# Patient Record
Sex: MALE | Race: AMERICAN INDIAN OR ALASKA NATIVE | URBAN - METROPOLITAN AREA
[De-identification: names, ages, dates, MRNs, and addresses within clinical notes are randomized per-mention and may not be internally consistent; named-entity substitution may affect disease eponyms.]

---

## 2021-11-29 ENCOUNTER — OFFICE VISIT (OUTPATIENT)
Dept: ORTHOPEDIC SURGERY | Age: 36
End: 2021-11-29
Payer: COMMERCIAL

## 2021-11-29 VITALS — BODY MASS INDEX: 27.78 KG/M2 | HEIGHT: 67 IN | WEIGHT: 177 LBS | RESPIRATION RATE: 18 BRPM

## 2021-11-29 DIAGNOSIS — M54.16 LUMBAR RADICULAR PAIN: Primary | ICD-10-CM

## 2021-11-29 PROBLEM — M54.42 LEFT-SIDED LOW BACK PAIN WITH LEFT-SIDED SCIATICA: Status: ACTIVE | Noted: 2021-11-29

## 2021-11-29 PROCEDURE — 1036F TOBACCO NON-USER: CPT | Performed by: PHYSICIAN ASSISTANT

## 2021-11-29 PROCEDURE — 99204 OFFICE O/P NEW MOD 45 MIN: CPT | Performed by: PHYSICIAN ASSISTANT

## 2021-11-29 PROCEDURE — G8427 DOCREV CUR MEDS BY ELIG CLIN: HCPCS | Performed by: PHYSICIAN ASSISTANT

## 2021-11-29 PROCEDURE — G8484 FLU IMMUNIZE NO ADMIN: HCPCS | Performed by: PHYSICIAN ASSISTANT

## 2021-11-29 PROCEDURE — G8419 CALC BMI OUT NRM PARAM NOF/U: HCPCS | Performed by: PHYSICIAN ASSISTANT

## 2021-11-29 RX ORDER — METHYLPREDNISOLONE 4 MG/1
TABLET ORAL
Qty: 1 KIT | Refills: 0 | Status: SHIPPED | OUTPATIENT
Start: 2021-11-29

## 2021-11-30 NOTE — PROGRESS NOTES
History of present illness:   Mr. Braxton Dawson is a pleasant 39 y.o. male presents to the office for initial evaluation of low back pain and left anterior thigh pain with associated numbness left anterior thigh. He believes he may have been lifting heavy boxes of tile when symptoms began. He states his pain began 3 to 5 months ago. [facility] His pain has steadily worsened since onset. He rates his back pain 6/10 and leg pain anterior thigh 4/10. He describes the pain as throbbing. He denies weakness of his left or right leg. He denies bowel or bladder dysfunction and saddle anesthesia. He states he can sit for a maximum of 60 minutes and stand for a maximum 30 minutes. The pain does not disrupt his sleep. He has tried chiropractic manipulations and therapy x3 visits with mild temporary relief. Past medical history:  His past medical history has been reviewed. History reviewed. No pertinent past medical history. His past surgical history has been reviewed. Past Surgical History:   Procedure Laterality Date    EAR SURGERY Left 2000    KNEE ARTHROSCOPY Left 2008    NOSE SURGERY  2008         His medications and allergies were reviewed. Current Outpatient Medications   Medication Sig Dispense Refill    methylPREDNISolone (MEDROL, KANG,) 4 MG tablet Take by mouth. 6 po day one 5 po day 2 4 po day 3 3 po day 4 2 po day 5 1 po day 6. 1 kit 0     No current facility-administered medications for this visit. His social history has been reviewed. Social History     Occupational History    Not on file   Tobacco Use    Smoking status: Never Smoker    Smokeless tobacco: Never Used   Substance and Sexual Activity    Alcohol use: Never    Drug use: Never    Sexual activity: Not on file         His family history has been reviewed.    Family History   Problem Relation Age of Onset    Lung Cancer Father          Review of symptoms:  Review of Systems:  I have reviewed the clinically relevant past medical history, medications, allergies, family history, social history, and 13 point Review of Systems from the patient's recent history form & documented any details relevant to today's presenting complaints in the history above. The patient's self-reported past medical history, medications, allergies, family history, social history, and Review of Systems form from today's date have been scanned into the chart under the \"Media\" tab. Patient's review of symptoms was reviewed and is significant for back pain and negative for recent weight loss, fatigue, chills, visual disturbances, blood in stool or urine, recent infection. Physical examination:  Mr. Tamica Taveras most recent vitals:  Vitals  Resp: 18  Height: 5' 7\" (170.2 cm)  Weight: 177 lb (80.3 kg)  Body mass index is 27.72 kg/m². General exam:  He is well-developed and well-nourished, is in obvious discomfort and alert and oriented to person, place, and time. He demonstrates appropriate mood and affect. His skin is warm and dry. His gait is normal and he walks heel to toe without significant limp or instability. Back:  He stands with slight lumbar flexion. His lumbar flexion, extension and lateral bending are moderately reduced with pain. He has mild tenderness over his lumbar spine without obvious muscle spasm. The skin over his lumbar spine is normal without a surgical scar. Lower extremities:  He has 5/5 motor strength of bilateral lower extremities. He has a negative straight leg raise, bilaterally. He has a negative femoral stretch test bilaterally. Deep tendon reflexes at knees and achilles are 2+. Sensation is intact to light touch L3 to S1 bilaterally. He has no clonus. Hip range of motion is normal and pain-free  Stinchfield test is negative. Abdomen:  Non-tender and non-distended. No rash. Imaging:  X rays was obtained in the office today.   AP and lateral lumbar spine shows minimal degenerative disc disease with minimal disc space narrowing L4-5 and L5-S1. Diagnosis:      ICD-10-CM    1. Lumbar radicular pain  M54.16 XR LUMBAR SPINE (2-3 VIEWS)     methylPREDNISolone (MEDROL, KANG,) 4 MG tablet     Ambulatory referral to Physical Therapy          Assessment/ Plan:  Low back pain with left anterior thigh pain, numbness for 3 to 5 months. Symptoms started after lifting a heavy box of tile. He may have a lumbar disc herniation. He remains neurologically intact in both lower extremities. I had an extensive discussion with Mr. Coni Vaz and/or family regarding the natural history, etiology, and long term consequences of his condition. I have presented reasonable alternatives to the patient's proposed care, treatment, and services. Risks and benefits of the treatment options also reviewed in detail. I have outlined a treatment plan with them. He has had full opportunity to ask his questions. I have answered them all to his satisfaction. I feel that Mr. Coni Vaz understands our discussion today     New Medications prescribed today-Medrol Dosepak. PT-referred to physical therapy 2-3 times a week. He was advised to avoid lifting greater than 10 pounds. Further Imaging-discussed possible MRI lumbar spine if symptoms fail to resolve with medications and therapy or if symptoms worsen. Follow up- 2 weeks. He was instructed to call us emergently if he begins to experience bowel or bladder dysfunction, saddle anesthesia, increasing muscle weakness, or onset/ worsening leg symptoms. Chanel Handler MCKINLEY   Senior Physician Assistant   [facility]                                         Disclaimer: This note was generated with use of a verbal recognition program (DRAGON) and an attempt was made to check for errors.   It is possible that there are still dictated errors within this office note. If so, please bring any significant errors to my attention for an addendum. All efforts were made to ensure that this office note is accurate.

## 2021-12-09 ENCOUNTER — HOSPITAL ENCOUNTER (OUTPATIENT)
Dept: PHYSICAL THERAPY | Age: 36
Setting detail: THERAPIES SERIES
Discharge: HOME | End: 2021-12-09
Payer: COMMERCIAL

## 2021-12-09 PROCEDURE — 97110 THERAPEUTIC EXERCISES: CPT

## 2021-12-09 PROCEDURE — 97161 PT EVAL LOW COMPLEX 20 MIN: CPT

## 2021-12-09 NOTE — PLAN OF CARE
[place], [facility], [address]  Phone: [phone]   Fax: [phone]     Physical Therapy Certification    Dear Referring Practitioner: TL Lopez,    We had the pleasure of evaluating the following patient for physical therapy services at [address] [place] [address]. A summary of our findings can be found in the initial assessment below. This includes our plan of care. If you have any questions or concerns regarding these findings, please do not hesitate to contact me at the office phone number checked above. Thank you for the referral.       Physician Signature:_______________________________Date:__________________  By signing above (or electronic signature), therapists plan is approved by physician      Patient: Yenny Khan   : 1985   MRN: [id number]  Referring Physician: Referring Practitioner: TL Lopez      Evaluation Date: 2021      Medical Diagnosis Information:  Diagnosis: M54.16 (ICD-10-CM) - Lumbar radicular pain   Treatment Diagnosis: LBP, impaired posture, decreased lumbar and hip ROM, decreased hip strength, lumbar jt hypomobility                                         Insurance information: PT Insurance Information: [employer]     Precautions/ Contra-indications: avoid lifting greater than 10 pounds. Latex Allergy:  [x]NO      []YES  Preferred Language for Healthcare:   [x]English       []Other:    C-SSRS Triggered by Intake questionnaire (Past 2 wk assessment ):   [x] No, Questionnaire did not trigger screening.   [] Yes, Patient intake triggered C-SSRS Screening     [] Completed, no further action required. [] Completed, PCP notified via Epic    SUBJECTIVE: Patient reports he started having pain in . He was lifting tile in early  and a couple of weeks later started having pain. He also had twins in .  He saw the chiropractor approximately 4 visit, which was helpful but did not provide long last relief. Since the onset of symptoms, things have improved to the point when he is not working he doesn't have pain, but if he works the pain returns. Prescribed mederol dose pack, but has not taken. Wants to try therapy first for now. Symptoms are located at the R and L sides of the low back and the anterior L thigh. 11/30/21:  Imaging:  X rays was obtained in the office today. AP and lateral lumbar spine shows minimal degenerative disc disease with minimal disc space narrowing L4-5 and L5-S1. Fear avoidance: I should not do physical activities that (might) make my pain worse   [] True   [] False     Relevant Medical History: PMHx: L knee arthroscopy 2008  Functional Outcome: Oswestry: raw score = ; dysfunction = %-pt to return npv    Pain Scale: 5/10  Easing factors: sit, relaxing, avoiding work   Provocative factors: standing, working    Type: [x]Constant   [x]Intermittent  []Radiating []Localized []other:     Numbness/Tingling: Numbness affecting L anterior thigh    Occupation/School:     Living Status/Prior Level of Function: Prior to this injury / incident, pt was independent with ADLs and IADLs,    OBJECTIVE:   Palpation: mild TTP B PSIS, B lumbar paraspinals    Functional Mobility/Transfers: WFL    Posture: Standing: L iliac crest elevation, increased lumbar lordosis,  Increased thoracic kyphosis;  Supine: R ant innominate    Inspection:     Gait: (include devices/WB status) no AD, decreased arm swing and trunk rotation    Bandages/Dressings/Incisions: NA    Dermatomes Normal Abnormal Comments   inguinal area (L1)       anterior mid-thigh (L2)      distal ant thigh/med knee (L3) x     medial lower leg and foot (L4) x     lateral lower leg and foot (L5) x     posterior calf (S1) x     medial calcaneus (S2) x         Reflexes Normal Abnormal Comments   S1-2 Seated achilles x  2+ Bilat   S1-2 Prone knee bend      L3-4 Patellar tendon x  2+ Bilat   Clonus x  Bilat   Babinski          ROM  Comments   Lumbar Flex WNL    Lumbar Ext WNL Reproduces symptoms on R     ROM LEFT RIGHT Comments   Lumbar Side Bend WNL WNL    Lumbar Rotation WNL WNL    Hip Flexion Mod restriction Mod restriction    Hip Abd      Hip ER Mod restriction Mod restriction    Hip IR      Hip Extension      Knee Ext      Knee Flex      Hamstring Flex      Piriformis                      Joint mobility:    []Normal    [x]Hypo-lumbar P-A L3-L5 central and R unilateral   []Hyper      Strength / Myotomes LEFT RIGHT Comments   Multifidus      Transverse Ab      Hip Flexors (L1-2) 5 5    Hip Abductors 4+ 4*    Hip Extensors 4 4    Hip Internal Rotators      Hip External Rotators      Quads (L2-4) 5 5    Hamstrings  5 5    Ankle Plantarflexion (S1-2)      Ankle Dorsiflexion (L4-5) 5 5    Ankle Inversion      Ankle Eversion (S1-2)      Great Toe Extension (L5)        TA Muscle Contraction Scale    Criteria                                               Score  Quality of Contraction   Not Present      [] 0   Rapid, Superificial     [] 1   Just Perceptible     [] 2     Gentle, Slow      [] 3    Substitution   Resting       [] 0   Moderate to Strong     [] 1    Subtle Perceptible     [] 2   None       [] 3    Symmetry of Contraction   Unilateral       [] 0   Bilateral/Asymmetrical     [] 1   Symmetrical       [] 2    Breathing     Inability/Difficulty Breathing during contraction [] 0   Able to hold contraction while Breathing  [] 1    Holding   Able to Hold Contraction <10 s   [] 0   Able to Hold Contraction >10 s   [] 1      __/10  Adapted from Juan A gallego, Copyright 2009        Neural dynamic tension testing Normal Abnormal Comments   Slump Test  - Degree of knee flexion:  x     SLR       0-30 x     30-70 x     Femoral nerve (L2-4) x         Orthopedic Special Tests:    Normal Abnormal N/A Comments   Toe walk         Heel Walk       Fwd Bend-aberrant or innominate mvmt)       Standing Flexion test  x     Trendelenburg       Kemps/Quadrant       Noemyk       DAVID/Lucio       Hip scour       SLR x      Crossed SLR x      Supine to sit  x     Hip thrust       SI distraction/compression x      PA/Spring       Prone Instability test       Prone knee bend  x  Gets longer on R   Sacral Spring/thrust x      Seated flexion  x         [x] Patient history, allergies, meds reviewed. Medical chart reviewed. See intake form. Review Of Systems (ROS):  [x]Performed Review of systems (Integumentary, CardioPulmonary, Neurological) by intake and observation. Intake form has been scanned into medical record. Patient has been instructed to contact their primary care physician regarding ROS issues if not already being addressed at this time.       Co-morbidities/Complexities (which will affect course of rehabilitation):   []None        []Hx of COVID   Arthritic conditions   []Rheumatoid arthritis (M05.9)  []Osteoarthritis (M19.91)  []Gout   Cardiovascular conditions   []Hypertension (I10)  []Hyperlipidemia (E78.5)  []Angina pectoris (I20)  []Atherosclerosis (I70)  []Pacemaker  []Hx of CABG/stent/  cardiac surgeries   Musculoskeletal conditions   []Disc pathology   []Congenital spine pathologies   []Osteoporosis (M81.8)  []Osteopenia (M85.8)  []Scoliosis       Endocrine conditions   []Hypothyroid (E03.9)  []Hyperthyroid Gastrointestinal conditions   []Constipation (N46.68)   Metabolic conditions   []Morbid obesity (E66.01)  []Diabetes type 1(E10.65) or 2 (E11.65)   []Neuropathy (G60.9)     Cardio/Pulmonary conditions   []Asthma (J45)  []Coughing   []COPD (J44.9)  []CHF  []A-fib   Psychological Disorders  []Anxiety (F41.9)  []Depression (F32.9)   []Other:   Developmental Disorders  []Autism (F84.0)  []CP (G80)  []Down Syndrome (Q90.9)  []Developmental delay     Neurological conditions  []Prior Stroke (I69.30)  []Parkinson's (G20)  []Encephalopathy (G93.40)  []MS (G35)  []Post-polio (G14)  []SCI  []TBI  []ALS Other conditions  []Fibromyalgia (M79.7)  []Vertigo  []Syncope  []Kidney Failure  []Cancer      []currently undergoing                treatment  []Pregnancy  []Incontinence   Prior surgeries  []involved limb  []previous spinal surgery  [] section birth  []hysterectomy  []bowel / bladder surgery  []other relevant surgeries   []Other:   L knee arthroscopy             Barriers to/and or personal factors that will affect rehab potential:              []Age  []Sex    []Smoker              []Motivation/Lack of Motivation                        [x]Co-Morbidities              []Cognitive Function, education/learning barriers              []Environmental, home barriers              []profession/work barriers  []past PT/medical experience  []other:  Justification: L knee pain    Falls Risk Assessment (30 days):  [x] Falls Risk assessed and no intervention required. [] Falls Risk assessed and Patient requires intervention due to being higher risk   TUG score (>12s at risk):     [] Falls education provided, including         ASSESSMENT: Pt is a 40 yo male referred to PT for lumbar radiculopathy. He presents with deficits in posture, lumbar and hip ROM, core and posterior lateral hip strength, lumbar jt mobility. These deficits contribute to pain and decreased functional status. He will benefit from skilled therapy to address these deficits, achieve goals, and progress to PLOF.     Functional Impairments:     [x]Noted lumbar/proximal hip hypomobility   []Noted lumbosacral and/or generalized hypermobility   [x]Decreased Lumbosacral/hip/LE functional ROM   []Decreased core/proximal hip strength and neuromuscular control    [x]Decreased LE functional strength    []Abnormal reflexes/sensation/myotomal/dermatomal deficits  []Reduced balance/proprioceptive control    []other:      Functional Activity Limitations (from functional questionnaire and intake)   [x]Reduced ability to tolerate prolonged functional positions   [x]Reduced ability or difficulty with changes of positions or transfers between positions   [x]Reduced ability to maintain good posture and demonstrate good body mechanics with sitting, bending, and lifting   []Reduced ability to sleep   [] Reduced ability or tolerance with driving and/or computer work   [x]Reduced ability to perform lifting, reaching, carrying tasks   [x]Reduced ability to squat   [x]Reduced ability to forward bend   []Reduced ability to ambulate prolonged functional periods/distances/surfaces   []Reduced ability to ascend/descend stairs   []other:       Participation Restrictions   [x]Reduced participation in self care activities   [x]Reduced participation in home management activities   [x]Reduced participation in work activities   []Reduced participation in social activities. []Reduced participation in sport/recreational activities. Classification:   []Signs/symptoms consistent with Lumbar instability/stabilization subgroup. [x]Signs/symptoms consistent with Lumbar mobilization/manipulation subgroup, myotomes and dermatomes intact. Meets manipulation criteria. []Signs/symptoms consistent with Lumbar direction specific/centralization subgroup   [x]Signs/symptoms consistent with Lumbar traction subgroup     []Signs/symptoms consistent with lumbar facet dysfunction   []Signs/symptoms consistent with lumbar stenosis type dysfunction   []Signs/symptoms consistent with nerve root involvement including myotome & dermatome dysfunction   []Signs/symptoms consistent with post-surgical status including: decreased ROM, strength and function.    []signs/symptoms consistent with pathology which may benefit from Dry needling     []other:      Prognosis/Rehab Potential:      []Excellent   [x]Good    []Fair   []Poor    Tolerance of evaluation/treatment:    []Excellent   [x]Good    []Fair   []Poor     Physical Therapy Evaluation Complexity Justification  [x] A history of present problem with:  [] no personal factors and/or comorbidities that impact the plan of care;  [x]1-2 personal factors and/or comorbidities that impact the plan of care  []3 personal factors and/or comorbidities that impact the plan of care  [x] An examination of body systems using standardized tests and measures addressing any of the following: body structures and functions (impairments), activity limitations, and/or participation restrictions;:  [] a total of 1-2 or more elements   [x] a total of 3 or more elements   [] a total of 4 or more elements   [x] A clinical presentation with:  [x] stable and/or uncomplicated characteristics   [] evolving clinical presentation with changing characteristics  [] unstable and unpredictable characteristics;   [x] Clinical decision making of [x] low, [] moderate, [] high complexity using standardized patient assessment instrument and/or measurable assessment of functional outcome. [x] EVAL (LOW) 21332 (typically 15 minutes face-to-face)  [] EVAL (MOD) 56525 (typically 30 minutes face-to-face)  [] EVAL (HIGH) 71180 (typically 45 minutes face-to-face)  [] RE-EVAL     PLAN: Begin PT focusing on: proximal hip mobilizations, LB mobs, LB core activation, proximal hip activation, and HEP    Frequency/Duration:  2 days per week for 6 Weeks:  Interventions:  [x]  Therapeutic exercise including: strength training, ROM, for LE, Glutes and core   [x]  NMR activation and proprioception for glutes , LE and Core   [x]  Manual therapy as indicated for Hip complex, LE and spine to include: Dry Needling/IASTM, STM, PROM, Gr I-IV mobilizations, manipulation. [x]  Modalities as needed that may include: thermal agents, E-stim, Biofeedback, US, iontophoresis as indicated  [x]  Patient education on joint protection, postural re-education, activity modification, progression of HEP. HEP instruction: Written HEP instructions provided and reviewed.      GOALS:  Patient stated goal: \"return to PLOF, decrease pain\"  [] Progressing: [] Met: [] Not Met: [] Adjusted    Therapist goals for Patient:   Short Term Goals: To be achieved in: 2 weeks  1. Independent in HEP and progression per patient tolerance, in order to prevent re-injury. [] Progressing: [] Met: [] Not Met: [] Adjusted  2. Patient will have a decrease in pain to facilitate improvement in movement, function, and ADLs as indicated by Functional Deficits. [] Progressing: [] Met: [] Not Met: [] Adjusted    Long Term Goals: To be achieved in: 6 weeks  1. Disability index score of 15% or less for the SARABJIT to assist with reaching prior level of function. [] Progressing: [] Met: [] Not Met: [] Adjusted  2. Patient will demonstrate increased AROM to WNL, good LS mobility, good hip ROM to allow for proper joint functioning as indicated by patients Functional Deficits. [] Progressing: [] Met: [] Not Met: [] Adjusted  3. Patient will demonstrate an increase in bilat gross hip 5/5 strength to good proximal hip and core activation to allow for proper functional mobility as indicated by patients Functional Deficits. [] Progressing: [] Met: [] Not Met: [] Adjusted  4. Patient will return to functional activities including standing x 45 min without increased symptoms or restriction. [] Progressing: [] Met: [] Not Met: [] Adjusted  5. Patient will return to functional activities including working as  without increased symptoms or restriction.   [] Progressing: [] Met: [] Not Met: [] Adjusted     Electronically signed by:  Ayush Resendiz, PT, DPT

## 2021-12-09 NOTE — FLOWSHEET NOTE
Esau Abernathy  Phone: [phone]   Fax: [phone]    Physical Therapy Treatment Note/ Progress Report:     Date:  2021    Patient Name:  Phil Rain    :  1985  MRN: [id number]  Restrictions/Precautions:    Medical/Treatment Diagnosis Information:  Diagnosis: M54.16 (ICD-10-CM) - Lumbar radicular pain  Treatment Diagnosis: LBP, impaired posture, decreased lumbar and hip ROM, decreased hip strength, lumbar jt hypomobility  Insurance/Certification information:  PT Insurance Information: [facility]  Physician Information:  Referring Practitioner: TL Curiel  Plan of care signed (Y/N): []  Yes [x]  No    Date of Patient follow up with Physician:      Progress Report: []  Yes  [x]  No     Date Range for reporting period:  Beginnin21  Ending:     Progress report due (10 Rx/or 30 days whichever is less): visit #10 or 04 (date)     Recertification due (POC duration/ or 90 days whichever is less): visit #12 or 3/9/22 (date)     Visit # Insurance Allowable Auth required? Date Range   1 TBD (30/yr) [x]  Yes  []  No TBD       Units approved Units used Date Range   TBD 2 TBD     Latex Allergy:  [x]NO      []YES  Preferred Language for Healthcare:   [x]English       []other:    Functional Scale:       Date assessed:  Oswestry: raw score = ; dysfunction = %   21-pt to return npv    Pain level:  10     SUBJECTIVE:  See eval    OBJECTIVE: See eval   Observation:    Test measurements:      RESTRICTIONS/PRECAUTIONS: avoid lifting greater than 10 pounds. , pmhx L knee arthroscopy 2008/on going L knee pain    Treatment based classification: traction and/or manip    Comparable sign: lumbar extension    Exercises/Interventions:     Therapeutic Exercise (29172) Resistance / level Sets / Seconds Reps Notes / Cues   Bike       Hamstring stretch  Hip flexor stretch       Posterior lateral hip strength       Hip mobility HEP:  SKTC  Piriformis stretch  Glute set    30\"  30\"  1/10\"   2 B  2 B  10     Pt advised to support L knee jt line  Pt requires VC to produce   Therapeutic Activities (68589)                                          Neuromuscular Re-ed (23028)                                          Manual Intervention ([id number])       Prone PA       GISTM/STM       Lumbar Manip ? SI Manip       Hip belt mobs       Hip LA distraction                              Modalities: consider lumbar traction    Pt. Education:  -pt educated on diagnosis, prognosis and expectations for rehab  -all pt questions were answered    Home Exercise Prorgam:  Access Code: [email or web address]  URL: [email or web address]  Date: 12/09/2021  Prepared by: Meridee Schirmer    Exercises  Hooklying Single Knee to Chest - 1 x daily - 7 x weekly - 1 sets - 4 reps - 30 hold  Supine Piriformis Stretch with Foot on Ground - 1 x daily - 7 x weekly - 1 sets - 4 reps - 30 hold  Hooklying Gluteal Sets - 1 x daily - 7 x weekly - 1 sets - 10 reps - 10 hold      Therapeutic Exercise and NMR EXR  [x] (79493) Provided verbal/tactile cueing for activities related to strengthening, flexibility, endurance, ROM  for improvements in proximal hip and core control with self care, mobility, lifting and ambulation.  [] (04246) Provided verbal/tactile cueing for activities related to improving balance, coordination, kinesthetic sense, posture, motor skill, proprioception  to assist with core control in self care, mobility, lifting, and ambulation.   [] (56720) Therapist is in constant attendance of 2 or more patients providing skilled therapy interventions, but not providing any significant amount of measurable one-on-one time to either patient, for improvements in LE, proximal hip, and core control in self care, mobility, lifting, ambulation and eccentric single leg control.      Therapeutic Activities:    [] (70162 or 35873) Provided verbal/tactile cueing for activities related to improving balance, coordination, kinesthetic sense, posture, motor skill, proprioception and motor activation to allow for proper function  with self care and ADLs  [] (44148) Provided training and instruction to the patient for proper core and proximal hip recruitment and positioning with ambulation re-education     Home Exercise Program:    [x] (98368) Reviewed/Progressed HEP activities related to strengthening, flexibility, endurance, ROM of core, proximal hip and LE for functional self-care, mobility, lifting and ambulation   [] (97277) Reviewed/Progressed HEP activities related to improving balance, coordination, kinesthetic sense, posture, motor skill, proprioception of core, proximal hip and LE for self care, mobility, lifting, and ambulation      Manual Treatments:  PROM / STM / Oscillations-Mobs:  G-I, II, III, IV (PA's, Inf., Post.)  [] (97286) Provided manual therapy to mobilize proximal hip and LS spine soft tissue/joints for the purpose of modulating pain, promoting relaxation,  increasing ROM, reducing/eliminating soft tissue swelling/inflammation/restriction, improving soft tissue extensibility and allowing for proper ROM for normal function with self care, mobility, lifting and ambulation. Charges:  Timed Code Treatment Minutes: 10   Total Treatment Minutes: 45       [x] EVAL - LOW (56441)   [] EVAL - MOD (10811)  [] EVAL - HIGH (55509)  [] RE-EVAL (45895)  [x] XG(31023) x   1    [] Ionto  [] NMR (74110) x       [] Vaso  [] Manual (47243) x       [] Ultrasound  [] TA x        [] Mech Traction (06438)  [] Aquatic Therapy x     [] ES (un) (86885):   [] Home Management Training x  [] ES(attended) (70814)   [] Dry Needling 1-2 muscles (56638):  [] Dry Needling 3+ muscles (291934  [] Group:      [] Other:     Goals:   Patient stated goal: \"return to PLOF, decrease pain\"  []? Progressing: []? Met: []? Not Met: []?  Adjusted     Therapist goals for Patient:   Short Term Goals: Limitations/Impairments:  []Sitting [x]Standing   [x]Walking []Squatting/bending    []Stairs [x]ADL's    []Transfers []Reaching  [x]Housework [x]Job related tasks  []Driving [x]Sports/Recreation   []Sleeping []Other:    ASSESSMENT:  See eval  Treatment/Activity Tolerance:  [x] Patient able to complete tx  [] Patient limited by fatique  [] Patient limited by pain  [] Patient limited by other medical complications  [] Other:     Prognosis: [x] Good [] Fair  [] Poor    Patient Requires Follow-up: [x] Yes  [] No    PLAN: See eval. PT 2x / week for 6 weeks. [] Continue per plan of care [] Alter current plan (see comments)  [x] Plan of care initiated [] Hold pending MD visit [] Discharge    Electronically signed by: Shelli Pinto, PT, DPT      Note: If patient does not return for scheduled/ recommended follow up visits, this note will serve as a discharge from care along with most recent update on progress.

## 2022-02-24 ENCOUNTER — HOSPITAL ENCOUNTER (OUTPATIENT)
Dept: PHYSICAL THERAPY | Age: 37
Setting detail: THERAPIES SERIES
Discharge: HOME | End: 2022-02-24
Payer: COMMERCIAL

## 2022-02-24 PROCEDURE — 97164 PT RE-EVAL EST PLAN CARE: CPT

## 2022-02-24 PROCEDURE — 97110 THERAPEUTIC EXERCISES: CPT

## 2022-02-24 PROCEDURE — 97140 MANUAL THERAPY 1/> REGIONS: CPT

## 2022-02-24 NOTE — PLAN OF CARE
Esau Abernathy  Phone: [phone]   Fax: [phone]  Physical Therapy Re-Certification Plan of Care    Dear TL Hoffmann,    We had the pleasure of treating the following patient for physical therapy services at [facility]. A summary of our findings can be found in the updated assessment below. This includes our plan of care. If you have any questions or concerns regarding these findings, please do not hesitate to contact me at the office phone number checked above. Thank you for the referral.     Physician Signature:________________________________Date:__________________  By signing above (or electronic signature), therapist's plan is approved by physician      Functional Outcome:   Owsestry Disability Total Scores: 16 pts, 32% disability 22           Overall Response to Treatment:   []Patient is responding well to treatment and improvement is noted with regards  to goals   []Patient should continue to improve in reasonable time if they continue HEP   []Patient has plateaued and is no longer responding to skilled PT intervention    []Patient is getting worse and would benefit from return to referring MD   []Patient unable to adhere to initial POC   [x]Other: See Assessment    Date range of Visits:  eval, 22 re-eval  Total Visits: 2    Recommendation:    []Continue PT 2x / wk for 6 weeks.                []Hold PT, pending MD visit      Physical Therapy Treatment Note/ Progress Report:     Date:  2022    Patient Name:  Soy Saavedra    :  1985  MRN: [id number]  Restrictions/Precautions:    Medical/Treatment Diagnosis Information:  Diagnosis: M54.16 (ICD-10-CM) - Lumbar radicular pain  Treatment Diagnosis: LBP, impaired posture, decreased lumbar and hip ROM, decreased hip strength, lumbar jt hypomobility  Insurance/Certification information:  PT Insurance Information: [facility]  Physician Information:  Referring Practitioner: TL Norris  Plan of care signed (Y/N): []  Yes [x]  No    Date of Patient follow up with Physician:      Progress Report: []  Yes  [x]  No     Date Range for reporting period:  Beginnin21  Ending:     Progress report due (10 Rx/or 30 days whichever is less): visit #10 or 13 (date)     Recertification due (POC duration/ or 90 days whichever is less): visit #12 or 3/9/22 (date)     Visit # Insurance Allowable Auth required? 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 They called him back and as soon as he returned to work the pain returned. Symptoms are located at the low back and anterior/lateral L thigh with prolonged standing. He also reports N/T in the area with occasional N/T in lateral 2 toes. He also has noticed pain in the upper back (points to central mid t spine). He continues to get exacerbation of symptoms with working and standing.       OBJECTIVE:    Observation:    Test measurements:      Posture: Standing: L illiac crest elevation, Seated: ilium level, Supine: R ant innominate, no apparent leg length discrepancy    ROM   Comments   Lumbar Flex WNL     Lumbar Ext WNL       ROM LEFT RIGHT Comments   Lumbar Side Bend WNL WNL     Lumbar Rotation WNL WNL     Hip Flexion Mod restriction Mod restriction     Hip Abd         Hip ER Mod restriction Mod restriction      Strength / Myotomes LEFT RIGHT Comments   Multifidus       Transverse Ab         Hip Flexors (L1-2) 5 5     Hip Abductors 4+ 4*     Hip Extensors 4+ 4+           Orthopedic Special Tests:     Normal Abnormal N/A Comments   Toe walk             Heel Walk           Fwd Bend-aberrant or innominate mvmt)           Standing Flexion test   x    R elevates before and higher than L   Trendelenburg           Kemps/Quadrant           Dominguez           DAVID/Lucio           Hip scour           SLR x         Crossed SLR x         Supine to sit  x        Hip thrust           SI distraction/compression x         PA/Spring           Prone Instability test           Prone knee bend   x   L gets longer   Sacral Spring/thrust x         Seated flexion  x              RESTRICTIONS/PRECAUTIONS: avoid lifting greater than 10 pounds. , pmhx L knee arthroscopy 2008/on going L knee pain    Treatment based classification: traction and/or manip    Comparable sign: lumbar extension    Exercises/Interventions:     Therapeutic Exercise (29726) Resistance / level Sets / Seconds Reps Notes / Cues   Bike       Hamstring stretch  Hip flexor stretch       Posterior lateral hip strength       Hip mobility       Cat cow       Hand heel rock                     HEP:  SKTC  Piriformis stretch  Glute set  Hip Flexor stretch    30\"  30\"  30\"   2 B  2 B  2 B     Pt advised to support L knee jt line  Pt requires VC to produce   Therapeutic Activities (62005)       Pt ed regarding posture correction, prognosis, POC  X 5 min                                 Neuromuscular Re-ed (62732)                                          Manual Intervention (53952)       Prone PA       GISTM/STM       Lumbar Manip ? SI Manip       Hip belt mobs       Hip LA distraction       L SI jt distraction mob  5 x 20\"     MET for R ant innominate   4 min              Modalities: consider lumbar traction    Pt.  Education:  -pt educated on diagnosis, prognosis and expectations for rehab  -all pt questions were answered    Home Exercise Hakeemgam:  Access Code: [email or web address]  URL: [email or web address]  Date: 12/09/2021  Prepared by: Syble [place]    Exercises  Hooklying Single Knee to Chest - 1 x daily - 7 x weekly - 1 sets - 4 reps - 30 hold  Supine Piriformis Stretch with Foot on Ground - 1 x daily - 7 x weekly - 1 sets - 4 reps - 30 hold  Hooklying Gluteal Sets - 1 x daily - 7 x weekly - 1 sets - 10 reps - 10 hold    Access Code: [email or web address]  URL: [email or web address]  Date: 02/24/2022  Prepared by: Syble [place]    Exercises  Hooklying Single Knee to Chest - 1 x daily - 7 x weekly - 1 sets - 4 reps - 30 hold  Supine Piriformis Stretch with Foot on Ground - 1 x daily - 7 x weekly - 1 sets - 4 reps - 30 hold  Hip Flexor Stretch with Chair - 1 x daily - 7 x weekly - 1 sets - 4 reps - 30 hold        Therapeutic Exercise and NMR EXR  [x] (48285) Provided verbal/tactile cueing for activities related to strengthening, flexibility, endurance, ROM  for improvements in proximal hip and core control with self care, mobility, lifting and ambulation.  [] (77836) Provided verbal/tactile cueing for activities related to improving balance, coordination, kinesthetic sense, posture, motor skill, proprioception  to assist with core control in self care, mobility, lifting, and ambulation.   [] (49397) Therapist is in constant attendance of 2 or more patients providing skilled therapy interventions, but not providing any significant amount of measurable one-on-one time to either patient, for improvements in LE, proximal hip, and core control in self care, mobility, lifting, ambulation and eccentric single leg control.      Therapeutic Activities:    [] (58114 or 15968) Provided verbal/tactile cueing for activities related to improving balance, coordination, kinesthetic sense, posture, motor skill, proprioception and motor activation to allow for proper function  with self care and ADLs  [] (87167) Provided training and instruction to the patient for proper core and proximal hip recruitment and positioning with ambulation re-education     Home Exercise Program:    [x] (51109) Reviewed/Progressed HEP activities related to strengthening, flexibility, endurance, ROM of core, proximal hip and LE for functional self-care, mobility, lifting and ambulation   [] (45456) Reviewed/Progressed HEP activities related to improving balance, coordination, kinesthetic sense, posture, motor skill, proprioception of core, proximal hip and LE for self care, mobility, lifting, and ambulation      Manual Treatments:  PROM / STM / Oscillations-Mobs:  G-I, II, III, IV (PA's, Inf., Post.)  [] (77051) Provided manual therapy to mobilize proximal hip and LS spine soft tissue/joints for the purpose of modulating pain, promoting relaxation,  increasing ROM, reducing/eliminating soft tissue swelling/inflammation/restriction, improving soft tissue extensibility and allowing for proper ROM for normal function with self care, mobility, lifting and ambulation. Charges:  Timed Code Treatment Minutes: 30   Total Treatment Minutes: 50       [] EVAL - LOW (32197)   [] EVAL - MOD (09159)  [] EVAL - HIGH (91860)  [x] RE-EVAL (41096)  [x] BK(34583) x   1    [] Ionto  [] NMR (48853) x       [] Vaso  [x] Manual (61695) x  1     [] Ultrasound  [] TA x        [] Mech Traction (31002)  [] Aquatic Therapy x     [] ES (un) (25653):   [] Home Management Training x  [] ES(attended) (81337)   [] Dry Needling 1-2 muscles (37077):  [] Dry Needling 3+ muscles (417351  [] Group:      [] Other:     Goals:   Patient stated goal: \"return to PLOF, decrease pain\"  []? Progressing: []? Met: []? Not Met: []? Adjusted     Therapist goals for Patient:   Short Term Goals: To be achieved in: 2 weeks  1. Independent in HEP and progression per patient tolerance, in order to prevent re-injury. []? Progressing: []? Met: []? Not Met: []? Adjusted  2.  Patient will have a decrease in pain to facilitate improvement in movement, function, and ADLs as indicated by Functional Deficits. []? Progressing: []? Met: []? Not Met: []? Adjusted     Long Term Goals: To be achieved in: 6 weeks  1. Disability index score of 15% or less for the SARABJIT to assist with reaching prior level of function. []? Progressing: []? Met: []? Not Met: []? Adjusted  2. Patient will demonstrate increased AROM to WNL, good LS mobility, good hip ROM to allow for proper joint functioning as indicated by patients Functional Deficits. []? Progressing: []? Met: []? Not Met: []? Adjusted  3. Patient will demonstrate an increase in bilat gross hip 5/5 strength to good proximal hip and core activation to allow for proper functional mobility as indicated by patients Functional Deficits. []? Progressing: []? Met: []? Not Met: []? Adjusted  4. Patient will return to functional activities including standing x 45 min without increased symptoms or restriction. []? Progressing: []? Met: []? Not Met: []? Adjusted  5. Patient will return to functional activities including working as  without increased symptoms or restriction. []? Progressing: []? Met: []? Not Met: []? Adjusted         Overall Progression Towards Functional goals/ Treatment Progress Update:  [] Patient is progressing as expected towards functional goals listed. [] Progression is slowed due to complexities/Impairments listed. [] Progression has been slowed due to co-morbidities.   [x] Plan just implemented, too soon to assess goals progression <30days   [] Goals require adjustment due to lack of progress  [] Patient is not progressing as expected and requires additional follow up with physician  [] Other    Persisting Functional Limitations/Impairments:  []Sitting [x]Standing   [x]Walking []Squatting/bending    []Stairs [x]ADL's    []Transfers []Reaching  [x]Housework [x]Job related tasks  []Driving [x]Sports/Recreation   []Sleeping []Other:    ASSESSMENT:  Pt is a 40 yo male referred to PT for lumbar radiculopathy. He was evaluated 12/9/22 and has not returned to PT until this date. He continues to presents with deficits in posture, lumbar and hip ROM, core and posterior lateral hip strength, lumbar jt mobility. These deficits contribute to pain and decreased functional status. He will benefit from skilled therapy to address these deficits, achieve goals, and progress to PLOF. Please follow up with response to manual interventions npv. Treatment/Activity Tolerance:  [x] Patient able to complete tx  [] Patient limited by fatique  [] Patient limited by pain  [] Patient limited by other medical complications  [] Other:     Prognosis: [x] Good [] Fair  [] Poor    Patient Requires Follow-up: [x] Yes  [] No    PLAN: See eval. PT 2x / week for 6 weeks. [x] Continue per plan of care [] Alter current plan (see comments)  [] Plan of care initiated [] Hold pending MD visit [] Discharge    Electronically signed by: Jose Carlos Chiu PT, DPT      Note: If patient does not return for scheduled/ recommended follow up visits, this note will serve as a discharge from care along with most recent update on progress.

## 2022-03-10 ENCOUNTER — HOSPITAL ENCOUNTER (OUTPATIENT)
Dept: PHYSICAL THERAPY | Age: 37
Setting detail: THERAPIES SERIES
Discharge: HOME | End: 2022-03-10
Payer: COMMERCIAL

## 2022-03-10 NOTE — FLOWSHEET NOTE
Physical Therapy  Cancellation/No-show Note  Patient Name:  Prem Logarchana  :  1985   Date:  3/10/2022  Cancelled visits to date: 0  No-shows to date: 1    Patient status for today's appointment patient:  []  Cancelled  []  Rescheduled appointment  [x]  No-show 3/10/22     Reason given by patient:  []  Patient ill  []  Conflicting appointment  []  No transportation    []  Conflict with work  []  No reason given  []  Other:     Comments:  Pt called 13 min after his scheduled appt time to state his back hurts too badly to attend PT. Phone call information:   []  Phone call made today to patient at _ time at number provided:    []  Patient answered, conversation as follows:    []  Patient did not answer, message left as follows:  []  Phone call not made today  [x]  Phone call not needed - pt contacted us to cancel and provided reason for cancellation. Pt R/S to 3/17/22. PLEASE REVIEW NS/CX POLICY AT [facility].     Electronically signed by:  Yvrose Kapoor, PT, DPT

## 2022-03-17 ENCOUNTER — HOSPITAL ENCOUNTER (OUTPATIENT)
Dept: PHYSICAL THERAPY | Age: 37
Setting detail: THERAPIES SERIES
Discharge: HOME | End: 2022-03-17
Payer: COMMERCIAL

## 2022-03-17 PROCEDURE — 97110 THERAPEUTIC EXERCISES: CPT

## 2022-03-17 PROCEDURE — 97140 MANUAL THERAPY 1/> REGIONS: CPT

## 2022-03-17 NOTE — PLAN OF CARE
[facility]  Phone: [phone]   Fax: [phone]    Physical Therapy Treatment Note/ Progress Report:     Date:  3/17/2022    Patient Name:  Selina Alvarado    :  1985  MRN: [id number]  Restrictions/Precautions:    Medical/Treatment Diagnosis Information:  Diagnosis: M54.16 (ICD-10-CM) - Lumbar radicular pain  Treatment Diagnosis: LBP, impaired posture, decreased lumbar and hip ROM, decreased hip strength, lumbar jt hypomobility  Insurance/Certification information:  PT Insurance Information: [facility]  Physician Information:  Referring Practitioner: Viona Primrose, PA  Plan of care signed (Y/N): []  Yes [x]  No    Date of Patient follow up with Physician:      Progress Report: []  Yes  [x]  No     Date Range for reporting period:  Beginnin21  Ending:     Progress report due (10 Rx/or 30 days whichever is less): visit #10 or 67 (date)     Recertification due (POC duration/ or 90 days whichever is less): visit #12 or 3/9/22 (date)     Visit # Insurance Allowable Auth required? Date Range   2 TBD (30/yr) [x]  Yes  []  No 21-22  Sumbited new auth 22       Units approved Units used Date Range   TBD 2 TBD     Latex Allergy:  [x]NO      []YES  Preferred Language for Healthcare:   [x]English       []other:    Functional Scale:       Date assessed:  Oswestry: raw score = ; dysfunction = %   21-pt to return npv  Oswestry: raw score = 16; dysfunction = 32%  22    Pain level:  0/10    SUBJECTIVE:  Pt reports he has been doing his exercises at home and feeling a little better. He was a little sore after the last appointment.      OBJECTIVE:    Observation:    Test measurements:      Posture: Standing: L illiac crest elevation, Seated: ilium level, Supine: R ant innominate, no apparent leg length discrepancy    ROM   Comments   Lumbar Flex WNL     Lumbar Ext WNL       ROM LEFT RIGHT Comments   Lumbar Side Bend WNL WNL     Lumbar Rotation WNL WNL     Hip Flexion Mod restriction Mod restriction     Hip Abd         Hip ER Mod restriction Mod restriction      Strength / Myotomes LEFT RIGHT Comments   Multifidus         Transverse Ab         Hip Flexors (L1-2) 5 5     Hip Abductors 4+ 4*     Hip Extensors 4+ 4+           Orthopedic Special Tests:     Normal Abnormal N/A Comments   Toe walk             Heel Walk           Fwd Bend-aberrant or innominate mvmt)           Standing Flexion test   x    R elevates before and higher than L   Trendelenburg           Kemps/Quadrant           Stork           DAVID/Lucio           Hip scour           SLR x         Crossed SLR x         Supine to sit  x        Hip thrust           SI distraction/compression x         PA/Spring           Prone Instability test           Prone knee bend   x   L gets longer   Sacral Spring/thrust x         Seated flexion  x              RESTRICTIONS/PRECAUTIONS: avoid lifting greater than 10 pounds. , pmhx L knee arthroscopy 2008/on going L knee pain    Treatment based classification: traction and/or manip    Comparable sign: lumbar extension    Exercises/Interventions:     Therapeutic Exercise (72569) Resistance / level Sets / Seconds Reps Notes / Cues   Bike 3.0 3 min     Hamstring stretch  Hip flexor stretch  30\"  30\" 2 B  2 B    Bridge  2 10    Hip mobility       Cat cow  1 10    Hand heel rock  1 10    Piriformis stretch  30\" 2 B each way                  HEP:  SKTC  Piriformis stretch  Glute set  Hip Flexor stretch                Pt advised to support L knee jt line  Pt requires VC to produce   Therapeutic Activities (35305)       Pt ed regarding posture correction, prognosis, POC                                   Neuromuscular Re-ed (51120)                                          Manual Intervention (34316)       Prone PA       GISTM/STM       R SL Lumbar Manip   3 min  3/17: Cavitation achieved in set up   SI Manip       Hip belt mobs       Hip LA distraction       L SI jt distraction mob  5 x 20\"     MET for R ant innominate   4 min              Modalities: consider lumbar traction    Pt. Education:  -pt educated on diagnosis, prognosis and expectations for rehab  -all pt questions were answered    Home Exercise Prorgam:  Access Code: [email or web address]  URL: [email or web address]  Date: 12/09/2021  Prepared by: Francoise Mash    Exercises  Hooklying Single Knee to Chest - 1 x daily - 7 x weekly - 1 sets - 4 reps - 30 hold  Supine Piriformis Stretch with Foot on Ground - 1 x daily - 7 x weekly - 1 sets - 4 reps - 30 hold  Hooklying Gluteal Sets - 1 x daily - 7 x weekly - 1 sets - 10 reps - 10 hold    Access Code: [email or web address]  URL: [email or web address]  Date: 02/24/2022  Prepared by: Francoise Mash    Exercises  Hooklying Single Knee to Chest - 1 x daily - 7 x weekly - 1 sets - 4 reps - 30 hold  Supine Piriformis Stretch with Foot on Ground - 1 x daily - 7 x weekly - 1 sets - 4 reps - 30 hold  Hip Flexor Stretch with Chair - 1 x daily - 7 x weekly - 1 sets - 4 reps - 30 hold        Therapeutic Exercise and NMR EXR  [x] (75224) Provided verbal/tactile cueing for activities related to strengthening, flexibility, endurance, ROM  for improvements in proximal hip and core control with self care, mobility, lifting and ambulation.  [] (54900) Provided verbal/tactile cueing for activities related to improving balance, coordination, kinesthetic sense, posture, motor skill, proprioception  to assist with core control in self care, mobility, lifting, and ambulation.   [] (21526) Therapist is in constant attendance of 2 or more patients providing skilled therapy interventions, but not providing any significant amount of measurable one-on-one time to either patient, for improvements in LE, proximal hip, and core control in self care, mobility, lifting, ambulation and eccentric single leg control.      Therapeutic Activities:    [] (78998 or 24028) Provided verbal/tactile cueing for activities related to improving balance, coordination, kinesthetic sense, posture, motor skill, proprioception and motor activation to allow for proper function  with self care and ADLs  [] (27261) Provided training and instruction to the patient for proper core and proximal hip recruitment and positioning with ambulation re-education     Home Exercise Program:    [x] (26096) Reviewed/Progressed HEP activities related to strengthening, flexibility, endurance, ROM of core, proximal hip and LE for functional self-care, mobility, lifting and ambulation   [] (56227) Reviewed/Progressed HEP activities related to improving balance, coordination, kinesthetic sense, posture, motor skill, proprioception of core, proximal hip and LE for self care, mobility, lifting, and ambulation      Manual Treatments:  PROM / STM / Oscillations-Mobs:  G-I, II, III, IV (PA's, Inf., Post.)  [] (97374) Provided manual therapy to mobilize proximal hip and LS spine soft tissue/joints for the purpose of modulating pain, promoting relaxation,  increasing ROM, reducing/eliminating soft tissue swelling/inflammation/restriction, improving soft tissue extensibility and allowing for proper ROM for normal function with self care, mobility, lifting and ambulation. Charges:  Timed Code Treatment Minutes: 38   Total Treatment Minutes: 38       [] EVAL - LOW (40773)   [] EVAL - MOD (19281)  [] EVAL - HIGH (60400)  [] RE-EVAL (30506)  [x] NK(81014) x   2    [] Ionto  [] NMR (23858) x       [] Vaso  [x] Manual (92950) x  1     [] Ultrasound  [] TA x        [] Mech Traction (62221)  [] Aquatic Therapy x     [] ES (un) (12897):   [] Home Management Training x  [] ES(attended) (21900)   [] Dry Needling 1-2 muscles (47362):  [] Dry Needling 3+ muscles (103643  [] Group:      [] Other:     Goals:   Patient stated goal: \"return to PLOF, decrease pain\"  []? Progressing: []? Met: []? Not Met: []?  Adjusted     Therapist goals for Patient:   Short Term Goals: To be achieved in: 2 weeks  1. Independent in HEP and progression per patient tolerance, in order to prevent re-injury. []? Progressing: []? Met: []? Not Met: []? Adjusted  2. Patient will have a decrease in pain to facilitate improvement in movement, function, and ADLs as indicated by Functional Deficits. []? Progressing: []? Met: []? Not Met: []? Adjusted     Long Term Goals: To be achieved in: 6 weeks  1. Disability index score of 15% or less for the SARABJIT to assist with reaching prior level of function. []? Progressing: []? Met: []? Not Met: []? Adjusted  2. Patient will demonstrate increased AROM to WNL, good LS mobility, good hip ROM to allow for proper joint functioning as indicated by patients Functional Deficits. []? Progressing: []? Met: []? Not Met: []? Adjusted  3. Patient will demonstrate an increase in bilat gross hip 5/5 strength to good proximal hip and core activation to allow for proper functional mobility as indicated by patients Functional Deficits. []? Progressing: []? Met: []? Not Met: []? Adjusted  4. Patient will return to functional activities including standing x 45 min without increased symptoms or restriction. []? Progressing: []? Met: []? Not Met: []? Adjusted  5. Patient will return to functional activities including working as  without increased symptoms or restriction. []? Progressing: []? Met: []? Not Met: []? Adjusted         Overall Progression Towards Functional goals/ Treatment Progress Update:  [] Patient is progressing as expected towards functional goals listed. [] Progression is slowed due to complexities/Impairments listed. [] Progression has been slowed due to co-morbidities.   [x] Plan just implemented, too soon to assess goals progression <30days   [] Goals require adjustment due to lack of progress  [] Patient is not progressing as expected and requires additional follow up with physician  [] Other    Persisting Functional Limitations/Impairments:  []Sitting [x]Standing   [x]Walking []Squatting/bending    []Stairs [x]ADL's    []Transfers []Reaching  [x]Housework [x]Job related tasks  []Driving [x]Sports/Recreation   []Sleeping []Other:    ASSESSMENT:  Pt reports consistency with HEP. He presents with improved pelvic posture this date, but continues to present with slight ant rotation of the R innominate. Pt tolerates progressions seen in bold above well. He reports slight increase in R sided LBP with bridging at end of session. He will benefit from skilled therapy to address these deficits, achieve goals, and progress to PLOF. Please follow up with response to manual interventions npv. Revisit posture education. Treatment/Activity Tolerance:  [x] Patient able to complete tx  [] Patient limited by fatique  [] Patient limited by pain  [] Patient limited by other medical complications  [] Other:     Prognosis: [x] Good [] Fair  [] Poor    Patient Requires Follow-up: [x] Yes  [] No    PLAN: See eval. PT 2x / week for 6 weeks. [x] Continue per plan of care [] Alter current plan (see comments)  [] Plan of care initiated [] Hold pending MD visit [] Discharge    Electronically signed by: Dahiana Friedman, PT, DPT      Note: If patient does not return for scheduled/ recommended follow up visits, this note will serve as a discharge from care along with most recent update on progress.

## 2022-03-23 ENCOUNTER — HOSPITAL ENCOUNTER (OUTPATIENT)
Dept: PHYSICAL THERAPY | Age: 37
Setting detail: THERAPIES SERIES
Discharge: HOME | End: 2022-03-23
Payer: COMMERCIAL

## 2022-03-23 NOTE — FLOWSHEET NOTE
Physical Therapy  Cancellation/No-show Note  Patient Name:  Santana Augustine  :  1985   Date:  3/23/2022  Cancelled visits to date: 0  No-shows to date: 2    Patient status for today's appointment patient:  []  Cancelled  []  Rescheduled appointment  [x]  No-show 3/10/22, 3/23     Reason given by patient:  []  Patient ill  []  Conflicting appointment  []  No transportation    []  Conflict with work  []  No reason given  []  Other:     Comments:      Phone call information:   [x]  Phone call made today to patient at [phone]  at number provided:    []  Patient answered, conversation as follows:    [x]  Patient did not answer, message left as follows: Pt reminded of no show/cancel policy. Provided with time and date of next scheduled visit, 22 at 1 pm. Requested pt call in advance if he will not be able to attend this visit. If he no shows his next visit he will be discharged per the policy. []  Phone call not made today  []  Phone call not needed - pt contacted us to cancel and provided reason for cancellation.      Electronically signed by:  Yoseph Alfaro, PT, DPT

## 2022-04-01 ENCOUNTER — HOSPITAL ENCOUNTER (OUTPATIENT)
Dept: PHYSICAL THERAPY | Age: 37
Setting detail: THERAPIES SERIES
Discharge: HOME | End: 2022-04-01
Payer: COMMERCIAL

## 2022-04-01 NOTE — FLOWSHEET NOTE
Physical Therapy  Cancellation/No-show Note  Patient Name:  Shikha Douglas  :  1985   Date:  2022  Cancelled visits to date: 0  No-shows to date: 3    Patient status for today's appointment patient:  []  Cancelled  []  Rescheduled appointment  [x]  No-show 3/10/22, 3/23,      Reason given by patient:  []  Patient ill  []  Conflicting appointment  []  No transportation    []  Conflict with work  []  No reason given  [x]  Other:     Comments:  Pt arrived 15 min late for appt, RSed    Phone call information:   []  Phone call made today to patient at [phone]  at number provided:    []  Patient answered, conversation as follows:    []  Patient did not answer, message left as follows:  []  Phone call not needed - pt contacted us to cancel and provided reason for cancellation.      Electronically signed by:  Emily Almeida, PT, DPT

## 2022-04-08 ENCOUNTER — HOSPITAL ENCOUNTER (OUTPATIENT)
Dept: PHYSICAL THERAPY | Age: 37
Setting detail: THERAPIES SERIES
Discharge: HOME | End: 2022-04-08
Payer: COMMERCIAL

## 2022-04-08 PROCEDURE — 97110 THERAPEUTIC EXERCISES: CPT

## 2022-04-08 PROCEDURE — 97112 NEUROMUSCULAR REEDUCATION: CPT

## 2022-04-08 PROCEDURE — 97140 MANUAL THERAPY 1/> REGIONS: CPT

## 2022-04-08 NOTE — FLOWSHEET NOTE
[facility], [address]  Phone: [phone]   Fax: [phone]    Physical Therapy Treatment Note/ Progress Report:     Date:  2022    Patient Name:  Veronica Gan    :  1985  MRN: [id number]  Restrictions/Precautions:    Medical/Treatment Diagnosis Information:  Diagnosis: M54.16 (ICD-10-CM) - Lumbar radicular pain  Treatment Diagnosis: LBP, impaired posture, decreased lumbar and hip ROM, decreased hip strength, lumbar jt hypomobility  Insurance/Certification information:  PT Insurance Information: [facility]  Physician Information:  Referring Practitioner: TL Petit  Plan of care signed (Y/N): []  Yes [x]  No    Date of Patient follow up with Physician:      Progress Report: []  Yes  [x]  No     Date Range for reporting period:  Beginnin21  Ending:     Progress report due (10 Rx/or 30 days whichever is less): visit #10 or  (date)     Recertification due (POC duration/ or 90 days whichever is less): visit #12 or 3/9/22 (date)     Visit # Insurance Allowable Auth required? Date Range   3 TBD (30/yr) [x]  Yes  []  No 21-22  Auth through 22       Units approved Units used Date Range   TBD 2 TBD     Latex Allergy:  [x]NO      []YES  Preferred Language for Healthcare:   [x]English       []other:    Functional Scale:       Date assessed:  Oswestry: raw score = ; dysfunction = %   21-pt to return npv  Oswestry: raw score = 16; dysfunction = 32%  22    Pain level:  0/10    SUBJECTIVE:  Pt reports he had numbness in his lower back after the last visit. The following day he had a lot of pain. He denies N/T affecting his leg recently.      OBJECTIVE:    Observation:    Test measurements:      Posture: Standing: L illiac crest elevation, Seated: ilium level, Supine: R ant innominate, no apparent leg length discrepancy    ROM   Comments   Lumbar Flex WNL     Lumbar Ext WNL       ROM LEFT RIGHT Comments Lumbar Side Bend WNL WNL     Lumbar Rotation WNL WNL     Hip Flexion Mod restriction Mod restriction     Hip Abd         Hip ER Mod restriction Mod restriction      Strength / Myotomes LEFT RIGHT Comments   Multifidus         Transverse Ab         Hip Flexors (L1-2) 5 5     Hip Abductors 4+ 4*     Hip Extensors 4+ 4+           Orthopedic Special Tests:     Normal Abnormal N/A Comments   Toe walk             Heel Walk           Fwd Bend-aberrant or innominate mvmt)           Standing Flexion test   x    R elevates before and higher than L   Trendelenburg           Kemps/Quadrant           Stork           DAVID/Lucio           Hip scour           SLR x         Crossed SLR x         Supine to sit  x        Hip thrust           SI distraction/compression x         PA/Spring           Prone Instability test           Prone knee bend   x   L gets longer   Sacral Spring/thrust x         Seated flexion  x              RESTRICTIONS/PRECAUTIONS: avoid lifting greater than 10 pounds. , pmhx L knee arthroscopy 2008/on going L knee pain    Treatment based classification: traction and/or manip    Comparable sign: lumbar extension    Exercises/Interventions:     Therapeutic Exercise (97229) Resistance / level Sets / Seconds Reps Notes / Cues   Bike 3.5 5 min     Hamstring stretch  Hip flexor stretch  30\"  30\" 2 B  2 B    Bridge  2 10    Hip mobility       Cat cow  1 10    Hand heel rock     Piriformis stretch  30\" 2 B each way                  HEP:  SKTC  Piriformis stretch  Glute set  Hip Flexor stretch                Pt advised to support L knee jt line  Pt requires VC to produce   Therapeutic Activities (06607)       Pt ed regarding posture correction, prognosis, POC                                   Neuromuscular Re-ed (70772)       TrA isometric  10\" 5 4/8   TrA isometric + march  1 10 4/8   TrA isometric +bent knee fall out  2 5 4/8   Hip add isometric with ball at knees  10\" 10 4/8   Hip abd isometric with towel roll 10\" 10 4/8                        Manual Intervention (93566)       Prone PA       GISTM/STM       R SL Lumbar Manip    3/17: Cavitation achieved in set up   SI Manip      Hip belt mobs      Hip LA distraction      L SI jt distraction mob      MET for R ant innominate       Ball belt traction  8 min       Modalities: consider lumbar traction    Pt. Education:  -pt educated on diagnosis, prognosis and expectations for rehab  -all pt questions were answered    Home Exercise Prorgam:  Access Code: [email or web address]  URL: [employer]/  Date: 12/09/2021  Prepared by: Anasco Nightingale    Exercises  Hooklying Single Knee to Chest - 1 x daily - 7 x weekly - 1 sets - 4 reps - 30 hold  Supine Piriformis Stretch with Foot on Ground - 1 x daily - 7 x weekly - 1 sets - 4 reps - 30 hold  Hooklying Gluteal Sets - 1 x daily - 7 x weekly - 1 sets - 10 reps - 10 hold    Access Code: [email or web address]  URL: [employer]/  Date: 02/24/2022  Prepared by: Anasco Nightingale    Exercises  Hooklying Single Knee to Chest - 1 x daily - 7 x weekly - 1 sets - 4 reps - 30 hold  Supine Piriformis Stretch with Foot on Ground - 1 x daily - 7 x weekly - 1 sets - 4 reps - 30 hold  Hip Flexor Stretch with Chair - 1 x daily - 7 x weekly - 1 sets - 4 reps - 30 hold        Therapeutic Exercise and NMR EXR  [x] (17312) Provided verbal/tactile cueing for activities related to strengthening, flexibility, endurance, ROM  for improvements in proximal hip and core control with self care, mobility, lifting and ambulation.  [] (98021) Provided verbal/tactile cueing for activities related to improving balance, coordination, kinesthetic sense, posture, motor skill, proprioception  to assist with core control in self care, mobility, lifting, and ambulation.   [] (69658) Therapist is in constant attendance of 2 or more patients providing skilled therapy interventions, but not providing any significant amount of measurable one-on-one time to either patient, progression <30days   [] Goals require adjustment due to lack of progress  [] Patient is not progressing as expected and requires additional follow up with physician  [] Other    Persisting Functional Limitations/Impairments:  []Sitting [x]Standing   [x]Walking []Squatting/bending    []Stairs [x]ADL's    []Transfers []Reaching  [x]Housework [x]Job related tasks  []Driving [x]Sports/Recreation   []Sleeping []Other:    ASSESSMENT:  Pt reports increased symptoms after previous visit and increased symptoms. Held most manual interventions this date. Reports no increase in symptoms with above interventions this date. Did trial ball belt lumbar traction with good tolerance. Please follow up with response at npv. He will benefit from skilled therapy to address these deficits, achieve goals, and progress to PLOF. Revisit posture education. Treatment/Activity Tolerance:  [x] Patient able to complete tx  [] Patient limited by fatique  [] Patient limited by pain  [] Patient limited by other medical complications  [] Other:     Prognosis: [x] Good [] Fair  [] Poor    Patient Requires Follow-up: [x] Yes  [] No    PLAN: See eval. PT 2x / week for 6 weeks. [x] Continue per plan of care [] Alter current plan (see comments)  [] Plan of care initiated [] Hold pending MD visit [] Discharge    Electronically signed by: Christy Thurman, PT, DPT      Note: If patient does not return for scheduled/ recommended follow up visits, this note will serve as a discharge from care along with most recent update on progress.

## 2022-04-14 ENCOUNTER — HOSPITAL ENCOUNTER (OUTPATIENT)
Dept: PHYSICAL THERAPY | Age: 37
Setting detail: THERAPIES SERIES
Discharge: HOME | End: 2022-04-14
Payer: COMMERCIAL

## 2022-04-14 PROCEDURE — 97110 THERAPEUTIC EXERCISES: CPT

## 2022-04-14 PROCEDURE — 97530 THERAPEUTIC ACTIVITIES: CPT

## 2022-04-14 NOTE — PLAN OF CARE
[facility]  Phone: [phone]   Fax: [phone]     Physical Therapy Discharge Summary        Dear TL Coates,    We had the pleasure of treating the following patient for physical therapy services at [facility]. A summary of our findings can be found in the updated assessment below. This includes our plan of care. If you have any questions or concerns regarding these findings, please do not hesitate to contact me at the office phone number checked above. Thank you for the referral.     Physician Signature:________________________________Date:__________________  By signing above (or electronic signature), therapist's plan is approved by physician      Functional Outcome:   Oswestry: raw score = 16; dysfunction = 32%  22  Oswestry: raw score = 16; dysfunction = 32%  22    Overall Response to Treatment:   []Patient is responding well to treatment and improvement is noted with regards  to goals   []Patient should continue to improve in reasonable time if they continue HEP   []Patient has plateaued and is no longer responding to skilled PT intervention    []Patient is getting worse and would benefit from return to referring MD   []Patient unable to adhere to initial POC   [x]Other: See Assessment    Date range of Visits: 22-22  Total Visits: 4    Recommendation:    [x] Discharge to [facility]. Follow up with PT or MD PRN.        Physical Therapy Treatment Note/ Progress Report:     Date:  2022    Patient Name:  Minnie Valdez    :  1985  MRN: [id number]  Restrictions/Precautions:    Medical/Treatment Diagnosis Information:  Diagnosis: M54.16 (ICD-10-CM) - Lumbar radicular pain  Treatment Diagnosis: LBP, impaired posture, decreased lumbar and hip ROM, decreased hip strength, lumbar jt hypomobility  Insurance/Certification information:  [facility] [place]  Physician Information:  Referring Practitioner: TL Venegas  Plan of care signed (Y/N): []  Yes [x]  No    Date of Patient follow up with Physician:      Progress Report: []  Yes  [x]  No     Date Range for reporting period:  Beginnin21  Endin22    Progress report due (10 Rx/or 30 days whichever is less): visit #10 or 47 (date)     Recertification due (POC duration/ or 90 days whichever is less): visit #12 or 3/9/22 (date)     Visit # Insurance Allowable Auth required? Date Range   4 TBD (30/yr) [x]  Yes  []  No 21-22  Auth through 22       Units approved Units used Date Range   TBD 2 TBD     Latex Allergy:  [x]NO      []YES  Preferred Language for Healthcare:   [x]English       []other:    Functional Scale:       Date assessed:  Oswestry: raw score = ; dysfunction = %   21-pt to return npv  Oswestry: raw score = 16; dysfunction = 32%  22  Oswestry: raw score = 16; dysfunction = 32%  22    Pain level:  0/10; worst pain in past week 7/10    SUBJECTIVE:  Pt reports he had increased low back pain after the last visit, but denies numbness. He has noticed that his symptoms are directly related to how physical his work is. There are times he is feeling better, but then the pain returns with more physical activities. Pt does report he has not had N/T in the leg in a long time. He would like for today to be his last day of therapy.      OBJECTIVE:    Observation:    Test measurements:      Posture: Standing: L illiac crest elevation, Seated: ilium level, Supine: R ant innominate, no apparent leg length discrepancy    ROM   Comments   Lumbar Flex WNL*     Lumbar Ext WNL*       ROM LEFT RIGHT Comments   Lumbar Side Bend WNL WNL     Lumbar Rotation WNL WNL     Hip Flexion Mod restriction Mod restriction     Hip Abd         Hip ER Mod restriction Mod restriction      Strength / Myotomes LEFT RIGHT Comments   Multifidus         Transverse Ab         Hip Flexors (L1-2) 5 5     Hip Abductors 4+ 4+*     Hip Extensors 4+ 4+           Orthopedic Special Tests:     Normal Abnormal N/A Comments   Toe walk             Heel Walk           Fwd Bend-aberrant or innominate mvmt)           Standing Flexion test  x       Trendelenburg           Kemps/Quadrant           Stork           DAVID/Lucio           Hip scour           SLR x         Crossed SLR x         Supine to sit   x    true leg length discrepancy   Hip thrust           SI distraction/compression x         PA/Spring           Prone Instability test           Prone knee bend   x   L gets longer   Sacral Spring/thrust x         Seated flexion  x              RESTRICTIONS/PRECAUTIONS: avoid lifting greater than 10 pounds. , pmhx L knee arthroscopy 2008/on going L knee pain    Treatment based classification: traction and/or manip    Comparable sign: lumbar extension    Exercises/Interventions:     Therapeutic Exercise (57013) Resistance / level Sets / Seconds Reps Notes / Cues   Bike 3.5 5 min     Hamstring stretch  Hip flexor stretch  30\"  30\" 2 B  2 B    Bridge     Hip mobility       Cat cow     Hand heel rock     Piriformis stretch  30\" 2 B each way                  HEP:  SKTC  Piriformis stretch  Glute set  Hip Flexor stretch                Pt advised to support L knee jt line  Pt requires VC to produce   Standing hip abd [place] 2 10 B 4/12   Standing hip ext  Sit to stand with OH lift orange 2  2 10 B  10 4/12                 Therapeutic Activities (62667)       Pt ed regarding posture correction, prognosis, POC       Reassessment of objective measures, updated prognosis, review of HEP, pt ed regarding consistency and outcomes,  Pt ed regarding leg length discrepancy, body mechanics with bending/lifting  30 min  4/14                        Neuromuscular Re-ed (13429)     TrA isometric  4/8   TrA isometric + march 4/8   TrA isometric +bent knee fall out  4/8   Hip add isometric with ball at knees  4/8   Hip abd isometric with towel roll  4/8                        Manual Intervention (00968)       Prone PA       GISTM/STM       R SL Lumbar Manip    3/17: Cavitation achieved in set up   SI Manip      Hip belt mobs      Hip LA distraction      L SI jt distraction mob      MET for R ant innominate       Ball belt traction         Modalities: consider lumbar traction    Pt. Education:  -pt educated on diagnosis, prognosis and expectations for rehab  -all pt questions were answered    Home Exercise Prorgam:  Access Code: [email or web address]  URL: [email or web address]  Date: 12/09/2021  Prepared by: Ma Parker    Exercises  Hooklying Single Knee to Chest - 1 x daily - 7 x weekly - 1 sets - 4 reps - 30 hold  Supine Piriformis Stretch with Foot on Ground - 1 x daily - 7 x weekly - 1 sets - 4 reps - 30 hold  Hooklying Gluteal Sets - 1 x daily - 7 x weekly - 1 sets - 10 reps - 10 hold    Access Code: [email or web address]  URL: [email or web address]  Date: 02/24/2022  Prepared by: Ma Parker    Exercises  Hooklying Single Knee to Chest - 1 x daily - 7 x weekly - 1 sets - 4 reps - 30 hold  Supine Piriformis Stretch with Foot on Ground - 1 x daily - 7 x weekly - 1 sets - 4 reps - 30 hold  Hip Flexor Stretch with Chair - 1 x daily - 7 x weekly - 1 sets - 4 reps - 30 hold    Access Code: [email or web address]  URL: [email or web address]  Date: 04/14/2022  Prepared by: Ma Parker    Exercises  Hooklying Single Knee to Chest - 1 x daily - 7 x weekly - 1 sets - 4 reps - 30 hold  Supine Piriformis Stretch with Foot on Ground - 1 x daily - 7 x weekly - 1 sets - 4 reps - 30 hold  Hip Flexor Stretch with Chair - 1 x daily - 7 x weekly - 1 sets - 4 reps - 30 hold  Seated Hamstring Stretch - 1 x daily - 7 x weekly - 1 sets - 4 reps - 30 hold  Hip Abduction with Resistance Loop - 1 x daily - 7 x weekly - 2 sets - 10 reps  Hip Extension with Resistance Loop - 1 x daily - 7 x weekly - 2 sets - 10 reps  Sit to Stand - 1 x daily - 7 x weekly - 2 sets - 10 reps        Therapeutic Exercise and NMR EXR  [x] (20783) Provided verbal/tactile cueing for activities related to strengthening, flexibility, endurance, ROM  for improvements in proximal hip and core control with self care, mobility, lifting and ambulation.  [] (14516) Provided verbal/tactile cueing for activities related to improving balance, coordination, kinesthetic sense, posture, motor skill, proprioception  to assist with core control in self care, mobility, lifting, and ambulation.   [] (87603) Therapist is in constant attendance of 2 or more patients providing skilled therapy interventions, but not providing any significant amount of measurable one-on-one time to either patient, for improvements in LE, proximal hip, and core control in self care, mobility, lifting, ambulation and eccentric single leg control.      Therapeutic Activities:    [] (35370 or 79990) Provided verbal/tactile cueing for activities related to improving balance, coordination, kinesthetic sense, posture, motor skill, proprioception and motor activation to allow for proper function  with self care and ADLs  [] (45156) Provided training and instruction to the patient for proper core and proximal hip recruitment and positioning with ambulation re-education     Home Exercise Program:    [x] (82872) Reviewed/Progressed HEP activities related to strengthening, flexibility, endurance, ROM of core, proximal hip and LE for functional self-care, mobility, lifting and ambulation   [] (59539) Reviewed/Progressed HEP activities related to improving balance, coordination, kinesthetic sense, posture, motor skill, proprioception of core, proximal hip and LE for self care, mobility, lifting, and ambulation      Manual Treatments:  PROM / STM / Oscillations-Mobs:  G-I, II, III, IV (PA's, Inf., Post.)  [] (22813) Provided manual therapy to mobilize proximal hip and LS spine soft tissue/joints for the purpose of modulating pain, promoting relaxation, increasing ROM, reducing/eliminating soft tissue swelling/inflammation/restriction, improving soft tissue extensibility and allowing for proper ROM for normal function with self care, mobility, lifting and ambulation. Charges:  Timed Code Treatment Minutes: 43   Total Treatment Minutes: 43       [] EVAL - LOW (22790)   [] EVAL - MOD (88443)  [] EVAL - HIGH (52521)  [] RE-EVAL (49983)  [x] EJ(81677) x   1    [] Ionto  [] NMR (21566) x       [] Vaso  [] Manual (21762) x       [] Ultrasound  [x] TA x  2      [] Mech Traction (74646)  [] Aquatic Therapy x     [] ES (un) (32065):   [] Home Management Training x  [] ES(attended) (24569)   [] Dry Needling 1-2 muscles (99144):  [] Dry Needling 3+ muscles (866859  [] Group:      [] Other:     Goals:   Patient stated goal: \"return to PLOF, decrease pain\"  []? Progressing: []? Met: []? Not Met: []? Adjusted     Therapist goals for Patient:   Short Term Goals: To be achieved in: 2 weeks  1. Independent in HEP and progression per patient tolerance, in order to prevent re-injury. [x]? Progressing: []? Met: []? Not Met: []? Adjusted  2. Patient will have a decrease in pain to facilitate improvement in movement, function, and ADLs as indicated by Functional Deficits. [x]? Progressing: []? Met: [x]? Not Met: []? Adjusted     Long Term Goals: To be achieved in: 6 weeks  1. Disability index score of 15% or less for the SARABJIT to assist with reaching prior level of function. []? Progressing: []? Met: [x]? Not Met: []? Adjusted  2. Patient will demonstrate increased AROM to WNL, good LS mobility, good hip ROM to allow for proper joint functioning as indicated by patients Functional Deficits. [x]? Progressing: []? Met: []? Not Met: []? Adjusted  3. Patient will demonstrate an increase in bilat gross hip 5/5 strength to good proximal hip and core activation to allow for proper functional mobility as indicated by patients Functional Deficits. [x]? Progressing: []? Met: []?  Not Met: []? Adjusted  4. Patient will return to functional activities including standing x 45 min without increased symptoms or restriction. [x]? Progressing: []? Met: []? Not Met: []? Adjusted  5. Patient will return to functional activities including working as  without increased symptoms or restriction. []? Progressing: []? Met: [x]? Not Met: []? Adjusted         Overall Progression Towards Functional goals/ Treatment Progress Update:  [] Patient is progressing as expected towards functional goals listed. [] Progression is slowed due to complexities/Impairments listed. [] Progression has been slowed due to co-morbidities. [x] Plan just implemented, too soon to assess goals progression <30days   [] Goals require adjustment due to lack of progress  [] Patient is not progressing as expected and requires additional follow up with physician  [] Other    Persisting Functional Limitations/Impairments:  []Sitting [x]Standing   [x]Walking []Squatting/bending    []Stairs [x]ADL's    []Transfers []Reaching  [x]Housework [x]Job related tasks  []Driving [x]Sports/Recreation   []Sleeping []Other:    ASSESSMENT:  Pt is a 40 yo male referred to PT for lumbar radiculopathy. He was evaluated 12/9/22 then did not returned to PT until 2/24/22 on which date he was re-evaluated. He has only been seen for 4 total visits in PT since 12/9/22. Attendance has been very inconsistent. He reports little change in symptoms other than he is has not experienced N/T in his anterior thigh for several weeks. He presents with improved hip strength compared to eval.  He continues to presents with deficits in posture, hip ROM, core and posterior lateral hip strength, lumbar jt mobility. At this time he would like to DC from PT. We updated and reviewed a compiled HEP this date. Pt demonstrates understanding. Extensive pt ed provided regarding importance of consistency in order to make gains in strength and flexibility.  Also reviewed proper bending and lifting mechanics for work. Pt does present with true leg length discrepancy (R one thumbs width shorter than L). Discussed potential, appropriate use of heel lift. Pt demonstrates understanding. Pt DCed this date with independent HEP. Treatment/Activity Tolerance:  [x] Patient able to complete tx  [] Patient limited by fatique  [] Patient limited by pain  [] Patient limited by other medical complications  [] Other:     Prognosis: [x] Good [] Fair  [] Poor    Patient Requires Follow-up: [] Yes  [x] No    PLAN: See eval. PT 2x / week for 6 weeks. [] Continue per plan of care [] Alter current plan (see comments)  [] Plan of care initiated [] Hold pending MD visit [x] Discharge    Electronically signed by: Keisha Noel PT, DPT      Note: If patient does not return for scheduled/ recommended follow up visits, this note will serve as a discharge from care along with most recent update on progress.

## 2022-04-21 ENCOUNTER — APPOINTMENT (OUTPATIENT)
Dept: PHYSICAL THERAPY | Age: 37
End: 2022-04-21
Payer: COMMERCIAL